# Patient Record
Sex: FEMALE | Race: WHITE | Employment: STUDENT | URBAN - METROPOLITAN AREA
[De-identification: names, ages, dates, MRNs, and addresses within clinical notes are randomized per-mention and may not be internally consistent; named-entity substitution may affect disease eponyms.]

---

## 2020-03-30 ENCOUNTER — APPOINTMENT (OUTPATIENT)
Dept: GENERAL RADIOLOGY | Age: 25
End: 2020-03-30
Attending: EMERGENCY MEDICINE
Payer: SELF-PAY

## 2020-03-30 ENCOUNTER — APPOINTMENT (OUTPATIENT)
Dept: CT IMAGING | Age: 25
End: 2020-03-30
Attending: EMERGENCY MEDICINE
Payer: SELF-PAY

## 2020-03-30 ENCOUNTER — HOSPITAL ENCOUNTER (EMERGENCY)
Age: 25
Discharge: HOME | End: 2020-03-30
Attending: EMERGENCY MEDICINE
Payer: SELF-PAY

## 2020-03-30 VITALS
HEART RATE: 113 BPM | OXYGEN SATURATION: 97 % | SYSTOLIC BLOOD PRESSURE: 113 MMHG | HEIGHT: 65 IN | WEIGHT: 130 LBS | RESPIRATION RATE: 18 BRPM | DIASTOLIC BLOOD PRESSURE: 56 MMHG | TEMPERATURE: 98.3 F | BODY MASS INDEX: 21.66 KG/M2

## 2020-03-30 DIAGNOSIS — N83.201 CYST OF RIGHT OVARY: Primary | ICD-10-CM

## 2020-03-30 LAB
ALBUMIN SERPL-MCNC: 4.2 G/DL (ref 3.5–5)
ALBUMIN/GLOB SERPL: 1.1 {RATIO} (ref 1.2–3.5)
ALP SERPL-CCNC: 95 U/L (ref 50–136)
ALT SERPL-CCNC: 24 U/L (ref 12–65)
ANION GAP SERPL CALC-SCNC: 7 MMOL/L (ref 7–16)
APPEARANCE UR: ABNORMAL
AST SERPL-CCNC: 15 U/L (ref 15–37)
BACTERIA URNS QL MICRO: 0 /HPF
BASOPHILS # BLD: 0.1 K/UL (ref 0–0.2)
BASOPHILS NFR BLD: 1 % (ref 0–2)
BILIRUB SERPL-MCNC: 0.2 MG/DL (ref 0.2–1.1)
BILIRUB UR QL: NEGATIVE
BUN SERPL-MCNC: 20 MG/DL (ref 6–23)
CALCIUM SERPL-MCNC: 9.2 MG/DL (ref 8.3–10.4)
CASTS URNS QL MICRO: ABNORMAL /LPF
CHLORIDE SERPL-SCNC: 105 MMOL/L (ref 98–107)
CO2 SERPL-SCNC: 28 MMOL/L (ref 21–32)
COLOR UR: YELLOW
CREAT SERPL-MCNC: 0.74 MG/DL (ref 0.6–1)
DIFFERENTIAL METHOD BLD: ABNORMAL
EOSINOPHIL # BLD: 0.2 K/UL (ref 0–0.8)
EOSINOPHIL NFR BLD: 2 % (ref 0.5–7.8)
EPI CELLS #/AREA URNS HPF: ABNORMAL /HPF
ERYTHROCYTE [DISTWIDTH] IN BLOOD BY AUTOMATED COUNT: 11.9 % (ref 11.9–14.6)
GLOBULIN SER CALC-MCNC: 3.9 G/DL (ref 2.3–3.5)
GLUCOSE SERPL-MCNC: 110 MG/DL (ref 65–100)
GLUCOSE UR STRIP.AUTO-MCNC: NEGATIVE MG/DL
HCG UR QL: NEGATIVE
HCG UR QL: NEGATIVE
HCT VFR BLD AUTO: 42.3 % (ref 35.8–46.3)
HGB BLD-MCNC: 14.1 G/DL (ref 11.7–15.4)
HGB UR QL STRIP: ABNORMAL
IMM GRANULOCYTES # BLD AUTO: 0 K/UL (ref 0–0.5)
IMM GRANULOCYTES NFR BLD AUTO: 0 % (ref 0–5)
KETONES UR QL STRIP.AUTO: NEGATIVE MG/DL
LEUKOCYTE ESTERASE UR QL STRIP.AUTO: ABNORMAL
LYMPHOCYTES # BLD: 3.4 K/UL (ref 0.5–4.6)
LYMPHOCYTES NFR BLD: 39 % (ref 13–44)
MCH RBC QN AUTO: 32 PG (ref 26.1–32.9)
MCHC RBC AUTO-ENTMCNC: 33.3 G/DL (ref 31.4–35)
MCV RBC AUTO: 96.1 FL (ref 79.6–97.8)
MONOCYTES # BLD: 0.6 K/UL (ref 0.1–1.3)
MONOCYTES NFR BLD: 6 % (ref 4–12)
NEUTS SEG # BLD: 4.6 K/UL (ref 1.7–8.2)
NEUTS SEG NFR BLD: 52 % (ref 43–78)
NITRITE UR QL STRIP.AUTO: NEGATIVE
NRBC # BLD: 0 K/UL (ref 0–0.2)
PH UR STRIP: 7 [PH] (ref 5–9)
PLATELET # BLD AUTO: 360 K/UL (ref 150–450)
PMV BLD AUTO: 8.8 FL (ref 9.4–12.3)
POTASSIUM SERPL-SCNC: 3.8 MMOL/L (ref 3.5–5.1)
PROT SERPL-MCNC: 8.1 G/DL (ref 6.3–8.2)
PROT UR STRIP-MCNC: 30 MG/DL
RBC # BLD AUTO: 4.4 M/UL (ref 4.05–5.2)
RBC #/AREA URNS HPF: ABNORMAL /HPF
SODIUM SERPL-SCNC: 140 MMOL/L (ref 136–145)
SP GR UR REFRACTOMETRY: 1.03 (ref 1–1.02)
UROBILINOGEN UR QL STRIP.AUTO: 0.2 EU/DL (ref 0.2–1)
WBC # BLD AUTO: 8.8 K/UL (ref 4.3–11.1)
WBC URNS QL MICRO: ABNORMAL /HPF

## 2020-03-30 PROCEDURE — 74018 RADEX ABDOMEN 1 VIEW: CPT

## 2020-03-30 PROCEDURE — 74011250636 HC RX REV CODE- 250/636: Performed by: EMERGENCY MEDICINE

## 2020-03-30 PROCEDURE — 81003 URINALYSIS AUTO W/O SCOPE: CPT

## 2020-03-30 PROCEDURE — 85025 COMPLETE CBC W/AUTO DIFF WBC: CPT

## 2020-03-30 PROCEDURE — 96374 THER/PROPH/DIAG INJ IV PUSH: CPT

## 2020-03-30 PROCEDURE — 81001 URINALYSIS AUTO W/SCOPE: CPT

## 2020-03-30 PROCEDURE — 80053 COMPREHEN METABOLIC PANEL: CPT

## 2020-03-30 PROCEDURE — 96375 TX/PRO/DX INJ NEW DRUG ADDON: CPT

## 2020-03-30 PROCEDURE — 81025 URINE PREGNANCY TEST: CPT

## 2020-03-30 PROCEDURE — 99284 EMERGENCY DEPT VISIT MOD MDM: CPT

## 2020-03-30 PROCEDURE — 74176 CT ABD & PELVIS W/O CONTRAST: CPT

## 2020-03-30 RX ORDER — MORPHINE SULFATE 2 MG/ML
2 INJECTION, SOLUTION INTRAMUSCULAR; INTRAVENOUS ONCE
Status: COMPLETED | OUTPATIENT
Start: 2020-03-30 | End: 2020-03-30

## 2020-03-30 RX ORDER — HYDROCODONE BITARTRATE AND IBUPROFEN 5; 200 MG/1; MG/1
1 TABLET ORAL
Qty: 15 TAB | Refills: 0 | Status: SHIPPED | OUTPATIENT
Start: 2020-03-30 | End: 2020-04-02

## 2020-03-30 RX ORDER — KETOROLAC TROMETHAMINE 30 MG/ML
INJECTION, SOLUTION INTRAMUSCULAR; INTRAVENOUS
Status: DISCONTINUED
Start: 2020-03-30 | End: 2020-03-30 | Stop reason: HOSPADM

## 2020-03-30 RX ORDER — MORPHINE SULFATE 4 MG/ML
INJECTION INTRAVENOUS
Status: DISCONTINUED
Start: 2020-03-30 | End: 2020-03-30 | Stop reason: HOSPADM

## 2020-03-30 RX ORDER — KETOROLAC TROMETHAMINE 30 MG/ML
30 INJECTION, SOLUTION INTRAMUSCULAR; INTRAVENOUS ONCE
Status: COMPLETED | OUTPATIENT
Start: 2020-03-30 | End: 2020-03-30

## 2020-03-30 RX ADMIN — SODIUM CHLORIDE 1000 ML: 900 INJECTION, SOLUTION INTRAVENOUS at 01:26

## 2020-03-30 RX ADMIN — KETOROLAC TROMETHAMINE 30 MG: 30 INJECTION, SOLUTION INTRAMUSCULAR at 01:30

## 2020-03-30 RX ADMIN — MORPHINE SULFATE 2 MG: 2 INJECTION, SOLUTION INTRAMUSCULAR; INTRAVENOUS at 01:22

## 2020-03-30 NOTE — ED PROVIDER NOTES
79-year-old female presenting for sudden onset left lower quadrant abdominal pain. She states it is colicky in nature. Sharp and radiates into her back. When it is at its worst she has some nausea. Before that she felt fine. The history is provided by the patient. Abdominal Pain    This is a new problem. The current episode started 3 to 5 hours ago. The problem occurs constantly. The problem has not changed since onset. The pain is associated with an unknown factor. The pain is located in the LLQ. The quality of the pain is colicky. The pain is at a severity of 8/10. The pain is severe. Associated symptoms include nausea and vomiting. Pertinent negatives include no anorexia, no fever, no belching, no diarrhea, no flatus, no hematochezia, no melena, no constipation, no dysuria, no frequency, no hematuria, no headaches, no arthralgias, no myalgias, no trauma, no chest pain, no testicular pain and no back pain. Nothing worsens the pain. The pain is relieved by nothing. The patient's surgical history non-contributory. No past medical history on file.     Past Surgical History:   Procedure Laterality Date    HX TONSILLECTOMY           Family History:   Problem Relation Age of Onset    Cancer Maternal Aunt         breast    Heart Disease Maternal Grandfather         chf    Cancer Paternal Grandmother         breast       Social History     Socioeconomic History    Marital status: SINGLE     Spouse name: Not on file    Number of children: Not on file    Years of education: Not on file    Highest education level: Not on file   Occupational History    Not on file   Social Needs    Financial resource strain: Not on file    Food insecurity     Worry: Not on file     Inability: Not on file    Transportation needs     Medical: Not on file     Non-medical: Not on file   Tobacco Use    Smoking status: Former Smoker    Smokeless tobacco: Never Used   Substance and Sexual Activity    Alcohol use: No    Drug use: No    Sexual activity: Not on file   Lifestyle    Physical activity     Days per week: Not on file     Minutes per session: Not on file    Stress: Not on file   Relationships    Social connections     Talks on phone: Not on file     Gets together: Not on file     Attends [employer] service: Not on file     Active member of club or organization: Not on file     Attends meetings of clubs or organizations: Not on file     Relationship status: Not on file    Intimate partner violence     Fear of current or ex partner: Not on file     Emotionally abused: Not on file     Physically abused: Not on file     Forced sexual activity: Not on file   Other Topics Concern    Not on file   Social History Narrative    Not on file         ALLERGIES: Patient has no known allergies. Review of Systems   Constitutional: Negative for fever. Cardiovascular: Negative for chest pain. Gastrointestinal: Positive for abdominal pain, nausea and vomiting. Negative for anorexia, constipation, diarrhea, flatus, hematochezia and melena. Genitourinary: Negative for dysuria, frequency, hematuria and testicular pain. Musculoskeletal: Negative for arthralgias, back pain and myalgias. Neurological: Negative for headaches. All other systems reviewed and are negative. Vitals:    03/30/20 0105   BP: 133/81   Pulse: (!) 113   Resp: 20   Temp: 98.3 °F (36.8 °C)   SpO2: 98%   Weight: 59 kg (130 lb)   Height: 5' 5\" (1.651 m)            Physical Exam  Vitals signs and nursing note reviewed. Constitutional:       Appearance: She is well-developed. Comments: Very thin. Uncomfortable appearing   HENT:      Head: Normocephalic and atraumatic. Eyes:      Conjunctiva/sclera: Conjunctivae normal.      Pupils: Pupils are equal, round, and reactive to light. Neck:      Musculoskeletal: Normal range of motion and neck supple. Cardiovascular:      Rate and Rhythm: Normal rate and regular rhythm.    Pulmonary:      Effort: Pulmonary effort is normal.      Breath sounds: Normal breath sounds. Abdominal:      General: Bowel sounds are normal.      Palpations: Abdomen is soft. Musculoskeletal: Normal range of motion. Skin:     General: Skin is warm and dry. Neurological:      Mental Status: She is alert and oriented to person, place, and time. MDM  Number of Diagnoses or Management Options  Cyst of right ovary:   Diagnosis management comments: 80-year-old female presenting for sudden onset left lower quadrant abdominal pain.   Differential based on exam is kidney stone versus ovarian cyst.       Amount and/or Complexity of Data Reviewed  Clinical lab tests: ordered and reviewed (Results for orders placed or performed during the hospital encounter of 03/30/20  -CBC WITH AUTOMATED DIFF       Result                      Value             Ref Range           WBC                         8.8               4.3 - 11.1 K*       RBC                         4.40              4.05 - 5.2 M*       HGB                         14.1              11.7 - 15.4 *       HCT                         42.3              35.8 - 46.3 %       MCV                         96.1              79.6 - 97.8 *       MCH                         32.0              26.1 - 32.9 *       MCHC                        33.3              31.4 - 35.0 *       RDW                         11.9              11.9 - 14.6 %       PLATELET                    360               150 - 450 K/*       MPV                         8.8 (L)           9.4 - 12.3 FL       ABSOLUTE NRBC               0.00              0.0 - 0.2 K/*       DF                          AUTOMATED                             NEUTROPHILS                 52                43 - 78 %           LYMPHOCYTES                 39                13 - 44 %           MONOCYTES                   6                 4.0 - 12.0 %        EOSINOPHILS                 2                 0.5 - 7.8 %         BASOPHILS                   1 0.0 - 2.0 %         IMMATURE GRANULOCYTES       0                 0.0 - 5.0 %         ABS. NEUTROPHILS            4.6               1.7 - 8.2 K/*       ABS. LYMPHOCYTES            3.4               0.5 - 4.6 K/*       ABS. MONOCYTES              0.6               0.1 - 1.3 K/*       ABS. EOSINOPHILS            0.2               0.0 - 0.8 K/*       ABS. BASOPHILS              0.1               0.0 - 0.2 K/*       ABS. IMM. GRANS.            0.0               0.0 - 0.5 K/*  -METABOLIC PANEL, COMPREHENSIVE       Result                      Value             Ref Range           Sodium                      140               136 - 145 mm*       Potassium                   3.8               3.5 - 5.1 mm*       Chloride                    105               98 - 107 mmo*       CO2                         28                21 - 32 mmol*       Anion gap                   7                 7 - 16 mmol/L       Glucose                     110 (H)           65 - 100 mg/*       BUN                         20                6 - 23 MG/DL        Creatinine                  0.74              0.6 - 1.0 MG*       GFR est AA                  >60               >60 ml/min/1*       GFR est non-AA              >60               >60 ml/min/1*       Calcium                     9.2               8.3 - 10.4 M*       Bilirubin, total            0.2               0.2 - 1.1 MG*       ALT (SGPT)                  24                12 - 65 U/L         AST (SGOT)                  15                15 - 37 U/L         Alk.  phosphatase            95                50 - 136 U/L        Protein, total              8.1               6.3 - 8.2 g/*       Albumin                     4.2               3.5 - 5.0 g/*       Globulin                    3.9 (H)           2.3 - 3.5 g/*       A-G Ratio                   1.1 (L)           1.2 - 3.5      -URINALYSIS W/ RFLX MICROSCOPIC       Result                      Value             Ref Range Color                       YELLOW                                Appearance                  CLOUDY                                Specific gravity            1.029 (H)         1.001 - 1.02*       pH (UA)                     7.0               5.0 - 9.0           Protein                     30 (A)            NEG mg/dL           Glucose                     NEGATIVE          mg/dL               Ketone                      NEGATIVE          NEG mg/dL           Bilirubin                   NEGATIVE          NEG                 Blood                       MODERATE (A)      NEG                 Urobilinogen                0.2               0.2 - 1.0 EU*       Nitrites                    NEGATIVE          NEG                 Leukocyte Esterase          TRACE (A)         NEG                 WBC                         10-20             0 /hpf              RBC                         0-3               0 /hpf              Epithelial cells            5-10              0 /hpf              Bacteria                    0                 0 /hpf              Casts                       3-5               0 /lpf         -HCG URINE, QL. - POC       Result                      Value             Ref Range           Pregnancy test,urine (*     NEGATIVE          NEG            -HCG URINE, QL. - POC       Result                      Value             Ref Range           Pregnancy test,urine (*     NEGATIVE          NEG            )  Tests in the radiology section of CPT®: ordered and reviewed (Ct Abd Pelv Wo Cont    Result Date: 3/30/2020  EXAM: Noncontrast CT abdomen and pelvis. INDICATION: Flank pain and hematuria. COMPARISON: None. TECHNIQUE: Axial CT images of the abdomen and pelvis were obtained without IV contrast. Radiation dose reduction techniques were used for this study. Our CT scanners use one or all of the following:  Automated exposure control, adjustment of the mA or kV according to patient size, iterative reconstruction. FINDINGS: - Liver: Within normal limits. - Gallbladder and bile ducts: Within normal limits. - Spleen: Within normal limits. - Urinary tract: Within normal limits. No urinary tract stone or hydronephrosis is seen. The left hemipelvis calcification on abdomen x-rays performed earlier today has a appearance of an incidental vascular phlebolith. - Adrenals: Within normal limits. - Pancreas: Within normal limits. - Gastrointestinal tract: Within normal limits. The appendix is normal in appearance, and contains a 3 mm appendicolith. - Retroperitoneum: Within normal limits. - Peritoneal cavity and abdominal wall: Within normal limits. - Pelvis: The uterus is retroverted, and contains an IUD. There is a 2.7 cm right adnexal cyst, presumably ovarian in origin. - Spine/bones: No acute process. - Other comments: None. IMPRESSION: 1. No urinary tract stone or hydronephrosis. 2. Retroverted uterus, containing an IUD. 3. 2.7 cm right adnexal cyst, presumably ovarian in origin. 4. Small appendicolith in an otherwise normal appendix.     )  Tests in the medicine section of CPT®: ordered and reviewed  Independent visualization of images, tracings, or specimens: yes (Personally reviewed the imaging)    Risk of Complications, Morbidity, and/or Mortality  Presenting problems: high  Diagnostic procedures: high  Management options: moderate  General comments: Patient has remained hemodynamically stable. Her symptoms seem to have abated at this time. CT is consistent with an ovarian cyst and there is no other signs of ureterolithiasis or hydronephrosis. Appendix does have an appendicolith but is otherwise normal in size the patient's symptoms do not sound like an appendicitis given that they were sudden in onset and colicky in nature. Urinalysis shows some white cells but no bacteria so I am less inclined to think that this is a urinary tract infection as well.   Sending the patient home with Vicoprofen and clear return precautions should she develop fevers or uncontrolled vomiting    I personally reviewed the patient's vital signs, laboratory tests, and/or radiological findings. I discussed these findings with the patient and their significance. I answered all questions and gave the patient clear return precautions.   The patient was discharged from the emergency department in stable condition        Patient Progress  Patient progress: improved         Procedures

## 2020-03-30 NOTE — DISCHARGE INSTRUCTIONS
Use the prescribed medication as needed for pain control. Contact your GYN this week and see if they have any follow-up appointments available as an outpatient.   Return to the emergency department if you develop any new or worrisome symptoms such as high fevers or uncontrolled vomiting

## 2020-03-30 NOTE — ED NOTES
I have reviewed discharge instructions with the patient. The patient verbalized understanding. Patient left ED via Discharge Method: ambulatory to Home with family member. Opportunity for questions and clarification provided. Patient given 1 scripts. To continue your aftercare when you leave the hospital, you may receive an automated call from our care team to check in on how you are doing. This is a free service and part of our promise to provide the best care and service to meet your aftercare needs.  If you have questions, or wish to unsubscribe from this service please call [phone]. Thank you for Choosing our [facility] Emergency Department.

## 2020-03-30 NOTE — ED TRIAGE NOTES
Pt arrives from home via POV with c/o left flank pain x 1 hour. Pt describes the pain as cramping with hardness in LLQ, rates the pain as 9/10. Pt ha an IUD, states it feels different. Denies urinary or bowel changes, no new meds/foods, no trauma or injury. Pt is ambulatory but in obvious distress.